# Patient Record
Sex: FEMALE | Race: WHITE | URBAN - METROPOLITAN AREA
[De-identification: names, ages, dates, MRNs, and addresses within clinical notes are randomized per-mention and may not be internally consistent; named-entity substitution may affect disease eponyms.]

---

## 2022-05-02 NOTE — IMPRESSION/PLAN
Impression: Drusen (degenerative) of macula, bilateral: H35.363. Plan: Pt told of AMD by her ECP. On Preservision AREDs 2.
May limit outcome of sx.

## 2022-05-02 NOTE — IMPRESSION/PLAN
Impression: Combined forms of age-related cataract, bilateral: H25.813. Plan: Cataract accounts for patient's complaint. Discussed treatment options. Surgical treatment is recommended. Surgical risk and benefits discussed. Patient elects surgical treatment. Pt is a candidate for toric, standard, LenSx and ORA. AIM: [facility] [facility] -- believe patient referred by Yasmin Freire; please confirm

## 2022-05-02 NOTE — IMPRESSION/PLAN
Impression: Retinal hemorrhage, right eye: H35.61.
-- Suspect PEHCR  Plan: Peripheral heme OD; not visually significant. Retina team to evaluate prior to sx.

## 2022-06-01 NOTE — IMPRESSION/PLAN
Impression: Cataract, bilateral: H25.813. Plan: Cataract accounts for patient's complaint. OK plan Ce/IOL surgery     KEEP f/u  Melani Hipps OD; please confirm

## 2022-06-01 NOTE — IMPRESSION/PLAN
Impression: Drusen (degenerative) of macula AMD OU  Plan: Pt told of AMD by her ECP. . Fermin Dec CONCUR>  This is DRY AMD  Specialized examination and high-resolution imaging confirm retinal changes c/w DRY Macular Degeneration. AREDS2 vits rvw'd w pt ([facility]IST[facility]), diet, fish, Amsler, non-smoking. RETINA See other plan.

## 2022-06-01 NOTE — IMPRESSION/PLAN
Impression: WET AMD OD (Periph. disciform) Plan: RIGHT eye SUPERIORLY Peripheral CNVM -- NOT in macula. GAVE Pt AREDS2 VISTA VITS . . . MONITOR. RTC RETINA FA in 3-4mo.   IF STABLE OBSERVE OD superior CNVM

## 2022-06-01 NOTE — IMPRESSION/PLAN
Impression: Retinal hemorrhage, right Plan: Peripheral heme OD; not visually significant and resolving. Reassured pt . . . NOT TEAR or RD. RESOLVING. 0

## 2022-07-05 NOTE — IMPRESSION/PLAN
Impression: Drusen (degenerative) of macula AMD OU Plan: Discussed with patient, understands this may limit vision after surgery.

## 2022-07-05 NOTE — IMPRESSION/PLAN
Impression: WET AMD OD (Periph. disciform) Plan: Discussed with patient, understands this may limit vision after surgery.

## 2022-07-05 NOTE — IMPRESSION/PLAN
Impression: Retinal hemorrhage, right Plan: Discussed with patient, understands this may limit vision after surgery.

## 2022-07-05 NOTE — IMPRESSION/PLAN
Impression: Cataract, bilateral: H25.813. Plan: Discussed cataract diagnosis with the patient. Discussed risks, benefits and alternatives to surgery including but not limited to: bleeding, infection, risk of vision loss, loss of the eye, need for other surgery. Patient voiced understanding and wishes to proceed. Patient elects surgical treatment. Advanced technology options Discussed with patient . Patient desires surgery OU, OD   first (( AIM  -0.25 OU, DEXYCU, Topical anesthesia, NO Lensx, NO ORA, NO LRI- AMP)) Patient understands the need for glasses after surgery for BCVA.

## 2022-07-12 NOTE — IMPRESSION/PLAN
Impression: S/P Cataract Extraction by phacoemulsification with IOL placement OD - 1 Day. Encounter for surgical aftercare following surgery on a sense organ  Z48.810.  Excellent post op course   Condition is improving - Plan: dexycu

## 2022-07-19 NOTE — IMPRESSION/PLAN
Impression: S/P Cataract Extraction by phacoemulsification with IOL placement OD - 8 Days. Encounter for surgical aftercare following surgery on a sense organ  Z48.810.  Excellent post op course   Condition is improving - Plan:

## 2022-08-23 NOTE — IMPRESSION/PLAN
Impression: S/P Cataract Extraction by phacoemulsification with IOL placement OS - 29 Days. Encounter for surgical aftercare following surgery on a sense organ  Z48.810. Excellent post op course   Condition is improving.
-- amd limits as expected.  Plan:

## 2022-09-01 ENCOUNTER — OFFICE VISIT (OUTPATIENT)
Dept: URBAN - METROPOLITAN AREA CLINIC 24 | Facility: CLINIC | Age: 82
End: 2022-09-01
Payer: MEDICARE

## 2022-09-01 DIAGNOSIS — H35.61 RETINAL HEMORRHAGE, RIGHT EYE: ICD-10-CM

## 2022-09-01 DIAGNOSIS — H35.363 DRUSEN (DEGENERATIVE) OF MACULA, BILATERAL: Primary | ICD-10-CM

## 2022-09-01 DIAGNOSIS — H35.3211 EXDTVE AGE-REL MCLR DEGN, RIGHT EYE, WITH ACTV CHRDL NEOVAS: ICD-10-CM

## 2022-09-01 PROCEDURE — 92250 FUNDUS PHOTOGRAPHY W/I&R: CPT | Performed by: OPHTHALMOLOGY

## 2022-09-01 PROCEDURE — 92235 FLUORESCEIN ANGRPH MLTIFRAME: CPT | Performed by: OPHTHALMOLOGY

## 2022-09-01 PROCEDURE — 92134 CPTRZ OPH DX IMG PST SGM RTA: CPT | Performed by: OPHTHALMOLOGY

## 2022-09-01 PROCEDURE — 99214 OFFICE O/P EST MOD 30 MIN: CPT | Performed by: OPHTHALMOLOGY

## 2022-09-01 NOTE — IMPRESSION/PLAN
Impression: Drusen (degenerative) of macula AMD OU (from [facility]) Plan: hx: [[Pt told of AMD by PCP -- DRY AMD  Specialized exam w high-res imaging confirm retinal changes c/w DRY Macular Degen. AREDS2 vits rvw'd w pt ([facility]IST[facility]), diet, fish, Amsler, non-smoking]] TODAY DRY AMD on imaging / FA / exam -- Reassured. RETINA See other plan.

## 2022-09-01 NOTE — IMPRESSION/PLAN
Impression: WET AMD OD (Periph. disciform) Plan: RIGHT eye SUPERIORLY Peripheral CNVM -- NOT in macula. Though technically this is WET DEGEN in the OD, it is extramacular and NOT Nina Dias / Niki Nurse / SURGERY. See other plan for AMD AREDS2 VISTA VITS . . . MONITOR. TODAY again DRY MACULA (small CNVM superior periph OD). RTC RETINA pos colors 4-5mo.  IF STABLE OBSERVE OD superior CNVM / FA future

## 2022-09-01 NOTE — IMPRESSION/PLAN
Impression: Retinal hemorrhage, right Plan: Peripheral heme OD; not visually significant and resolving. Reassured pt . . . NOT TEAR or RD. RESOLVING.

## 2023-01-11 ENCOUNTER — OFFICE VISIT (OUTPATIENT)
Dept: URBAN - METROPOLITAN AREA CLINIC 24 | Facility: CLINIC | Age: 83
End: 2023-01-11
Payer: MEDICARE

## 2023-01-11 DIAGNOSIS — H35.61 RETINAL HEMORRHAGE, RIGHT EYE: ICD-10-CM

## 2023-01-11 DIAGNOSIS — H35.3211 EXDTVE AGE-REL MCLR DEGN, RIGHT EYE, WITH ACTV CHRDL NEOVAS: Primary | ICD-10-CM

## 2023-01-11 PROCEDURE — 99214 OFFICE O/P EST MOD 30 MIN: CPT | Performed by: OPHTHALMOLOGY

## 2023-01-11 PROCEDURE — 92134 CPTRZ OPH DX IMG PST SGM RTA: CPT | Performed by: OPHTHALMOLOGY

## 2023-01-11 ASSESSMENT — INTRAOCULAR PRESSURE
OS: 15
OD: 15

## 2023-01-11 NOTE — IMPRESSION/PLAN
Impression: WET AMD OD (Periph. disciform) Plan: hx: [[RIGHT eye SUPERIOR Peripheral CNV - NOT in macula. Though technically this is WET DEGEN in OD, it's extramacular / NOT Armando Ratliff. See other plan for AMD AREDS2 VISTA VITS . . . MONITOR]]. TODAY DRY MACULA (small CNVM superior periph OD). [facility] RETINA 5mo plan FA ---  IF STABLE OBSERVE OD superior CNVM / Colors?

## 2023-07-19 ENCOUNTER — OFFICE VISIT (OUTPATIENT)
Dept: URBAN - METROPOLITAN AREA CLINIC 24 | Facility: CLINIC | Age: 83
End: 2023-07-19
Payer: MEDICARE

## 2023-07-19 DIAGNOSIS — H35.3211 EXUDATIVE AGE-RELATED MACULAR DEGENERATION, RIGHT EYE, WITH ACTIVE CHOROIDAL NEOVASCULARIZATION: Primary | ICD-10-CM

## 2023-07-19 PROCEDURE — 99214 OFFICE O/P EST MOD 30 MIN: CPT | Performed by: OPHTHALMOLOGY

## 2023-07-19 PROCEDURE — 92235 FLUORESCEIN ANGRPH MLTIFRAME: CPT | Performed by: OPHTHALMOLOGY

## 2023-07-19 PROCEDURE — 92134 CPTRZ OPH DX IMG PST SGM RTA: CPT | Performed by: OPHTHALMOLOGY

## 2023-07-19 PROCEDURE — 92250 FUNDUS PHOTOGRAPHY W/I&R: CPT | Performed by: OPHTHALMOLOGY

## 2023-07-19 ASSESSMENT — INTRAOCULAR PRESSURE
OS: 13
OD: 17

## 2023-07-19 NOTE — IMPRESSION/PLAN
Impression: WET AMD OD (Periph. disciform) DRY [place] (monitor) Plan: hx: [[RIGHT eye SUPERIOR Peripheral CNV - NOT in macula. Though technically this is WET DEGEN in OD, it's extramacular / NOT Carolyn Situ Ramona Betancourt. See other plan for AMD AREDS2 VISTA VITS . . . MONITOR]]. TODAY again no mac Hmg /  DRY MACULA (small CNVM superior periph OD). RTC RETINA  6mo colors - IF STABLE OBSERVE OD superior CNV /  HRA?